# Patient Record
Sex: FEMALE | Race: WHITE | NOT HISPANIC OR LATINO | Employment: FULL TIME | URBAN - METROPOLITAN AREA
[De-identification: names, ages, dates, MRNs, and addresses within clinical notes are randomized per-mention and may not be internally consistent; named-entity substitution may affect disease eponyms.]

---

## 2023-07-05 ENCOUNTER — HOSPITAL ENCOUNTER (OUTPATIENT)
Dept: DATA CONVERSION | Facility: HOSPITAL | Age: 33
Discharge: HOME | End: 2023-07-05
Attending: EMERGENCY MEDICINE

## 2023-07-05 DIAGNOSIS — R07.9 CHEST PAIN, UNSPECIFIED: ICD-10-CM

## 2023-07-05 DIAGNOSIS — S16.1XXA STRAIN OF MUSCLE, FASCIA AND TENDON AT NECK LEVEL, INITIAL ENCOUNTER: ICD-10-CM

## 2023-07-05 DIAGNOSIS — R51.9 HEADACHE, UNSPECIFIED: ICD-10-CM

## 2023-07-05 DIAGNOSIS — M25.512 PAIN IN LEFT SHOULDER: ICD-10-CM

## 2023-07-05 DIAGNOSIS — M54.2 CERVICALGIA: ICD-10-CM

## 2023-07-05 DIAGNOSIS — M54.9 DORSALGIA, UNSPECIFIED: ICD-10-CM
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
## 2023-07-05 DIAGNOSIS — S43.402A UNSPECIFIED SPRAIN OF LEFT SHOULDER JOINT, INITIAL ENCOUNTER: Primary | ICD-10-CM

## 2023-09-06 PROBLEM — E05.00 GRAVES' DISEASE: Status: ACTIVE | Noted: 2023-09-06

## 2023-09-06 PROBLEM — F41.9: Status: ACTIVE | Noted: 2023-09-06

## 2023-09-06 PROBLEM — N23 KIDNEY PAIN: Status: ACTIVE | Noted: 2023-09-06

## 2023-09-06 PROBLEM — O99.280: Status: ACTIVE | Noted: 2023-09-06
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
## 2023-09-06 PROBLEM — E89.0 POSTPROCEDURAL HYPOTHYROIDISM: Status: ACTIVE | Noted: 2023-09-06

## 2023-09-06 PROBLEM — F41.8 DEPRESSION WITH ANXIETY: Status: ACTIVE | Noted: 2023-09-06

## 2023-09-06 PROBLEM — E07.9: Status: ACTIVE | Noted: 2023-09-06

## 2023-09-06 PROBLEM — E11.9 DIABETES MELLITUS (MULTI): Status: ACTIVE | Noted: 2023-09-06

## 2023-09-06 PROBLEM — E10.65 TYPE 1 DIABETES MELLITUS WITH HYPERGLYCEMIA (MULTI): Status: ACTIVE | Noted: 2023-09-06

## 2023-09-06 PROBLEM — O99.340: Status: ACTIVE | Noted: 2023-09-06

## 2023-09-06 PROBLEM — K42.9 UMBILICAL HERNIA: Status: ACTIVE | Noted: 2023-09-06

## 2023-09-06 PROBLEM — O09.90 HIGH-RISK PREGNANCY (HHS-HCC): Status: ACTIVE | Noted: 2023-09-06

## 2023-09-06 PROBLEM — O35.BXX0 ABNORMAL FETAL ECHOCARDIOGRAM AFFECTING ANTEPARTUM CARE OF MOTHER (HHS-HCC): Status: ACTIVE | Noted: 2023-09-06

## 2023-09-06 PROBLEM — R07.9 CHEST PAIN: Status: ACTIVE | Noted: 2023-09-06

## 2023-09-06 RX ORDER — BLOOD SUGAR DIAGNOSTIC
STRIP MISCELLANEOUS
COMMUNITY
Start: 2021-07-20 | End: 2024-01-18

## 2023-09-06 RX ORDER — INSULIN DEGLUDEC 100 U/ML
INJECTION, SOLUTION SUBCUTANEOUS
COMMUNITY
End: 2024-02-02

## 2023-09-06 RX ORDER — INSULIN LISPRO 100 [IU]/ML
INJECTION, SOLUTION INTRAVENOUS; SUBCUTANEOUS
COMMUNITY
Start: 2021-11-10

## 2023-09-06 RX ORDER — ERYTHROMYCIN AND BENZOYL PEROXIDE 30; 50 MG/G; MG/G
1 GEL TOPICAL
COMMUNITY
Start: 2016-04-08 | End: 2024-01-18

## 2023-09-06 RX ORDER — DOCUSATE SODIUM 100 MG/1
100 CAPSULE, LIQUID FILLED ORAL 2 TIMES DAILY PRN
COMMUNITY
Start: 2014-11-11 | End: 2024-01-18

## 2023-09-06 RX ORDER — BLOOD-GLUCOSE CONTROL, NORMAL
EACH MISCELLANEOUS 3 TIMES DAILY
COMMUNITY
Start: 2022-11-08

## 2023-09-06 RX ORDER — NAPROXEN SODIUM 220 MG/1
TABLET, FILM COATED ORAL
COMMUNITY
Start: 2021-09-16 | End: 2024-01-18

## 2023-09-06 RX ORDER — QUETIAPINE FUMARATE 50 MG/1
50 TABLET, FILM COATED ORAL NIGHTLY
COMMUNITY
End: 2024-01-18

## 2023-09-06 RX ORDER — CALCIUM CITRATE/VITAMIN D3 200MG-6.25
TABLET ORAL 3 TIMES DAILY
COMMUNITY
Start: 2020-02-19

## 2023-09-06 RX ORDER — KETOROLAC TROMETHAMINE 10 MG/1
10 TABLET, FILM COATED ORAL 3 TIMES DAILY PRN
COMMUNITY
Start: 2021-11-19 | End: 2024-01-18

## 2023-09-06 RX ORDER — METHOCARBAMOL 750 MG/1
TABLET, FILM COATED ORAL EVERY 8 HOURS PRN
COMMUNITY
End: 2024-01-18

## 2023-09-06 RX ORDER — IBUPROFEN 600 MG/1
600 TABLET ORAL EVERY 6 HOURS PRN
COMMUNITY
Start: 2021-11-10 | End: 2024-01-18

## 2023-09-06 RX ORDER — LEVOTHYROXINE SODIUM 125 UG/1
125 TABLET ORAL
COMMUNITY
End: 2024-01-18

## 2023-09-06 RX ORDER — POLYETHYLENE GLYCOL 3350 17 G/17G
17 POWDER, FOR SOLUTION ORAL DAILY PRN
COMMUNITY
Start: 2021-11-10 | End: 2024-01-18

## 2023-10-06 ENCOUNTER — HOSPITAL ENCOUNTER (OUTPATIENT)
Dept: RADIOLOGY | Facility: HOSPITAL | Age: 33
Discharge: HOME | End: 2023-10-06
Payer: COMMERCIAL

## 2023-10-06 DIAGNOSIS — R10.30 LOWER ABDOMINAL PAIN, UNSPECIFIED: ICD-10-CM

## 2023-10-06 PROCEDURE — 2550000001 HC RX 255 CONTRASTS: Performed by: FAMILY MEDICINE

## 2023-10-06 PROCEDURE — 74177 CT ABD & PELVIS W/CONTRAST: CPT

## 2023-10-06 RX ADMIN — IOHEXOL 75 ML: 350 INJECTION, SOLUTION INTRAVENOUS at 11:27

## 2023-11-05 DIAGNOSIS — E10.65 TYPE 1 DIABETES MELLITUS WITH HYPERGLYCEMIA (MULTI): Primary | ICD-10-CM

## 2023-11-06 RX ORDER — INSULIN ASPART 100 [IU]/ML
INJECTION, SOLUTION INTRAVENOUS; SUBCUTANEOUS
Qty: 90 ML | Refills: 2 | Status: SHIPPED | OUTPATIENT
Start: 2023-11-06

## 2023-11-15 ENCOUNTER — ANCILLARY PROCEDURE (OUTPATIENT)
Dept: RADIOLOGY | Facility: CLINIC | Age: 33
End: 2023-11-15
Payer: COMMERCIAL

## 2023-11-15 DIAGNOSIS — R10.2 PELVIC AND PERINEAL PAIN: ICD-10-CM

## 2023-11-15 PROCEDURE — 76830 TRANSVAGINAL US NON-OB: CPT | Performed by: RADIOLOGY

## 2023-11-15 PROCEDURE — 76830 TRANSVAGINAL US NON-OB: CPT

## 2023-11-15 PROCEDURE — 76856 US EXAM PELVIC COMPLETE: CPT | Performed by: RADIOLOGY

## 2023-11-15 PROCEDURE — 76856 US EXAM PELVIC COMPLETE: CPT

## 2023-11-21 ENCOUNTER — TELEPHONE (OUTPATIENT)
Dept: ENDOCRINOLOGY | Facility: CLINIC | Age: 33
End: 2023-11-21
Payer: COMMERCIAL

## 2023-11-21 NOTE — TELEPHONE ENCOUNTER
Patient seen her primary and they said her TSH was low they changed her  medication from 125 to 112mcg also she said her neck feels swollen

## 2023-11-29 DIAGNOSIS — E10.65 TYPE 1 DIABETES MELLITUS WITH HYPERGLYCEMIA (MULTI): ICD-10-CM

## 2023-11-29 RX ORDER — PEN NEEDLE, DIABETIC 29 G X1/2"
NEEDLE, DISPOSABLE MISCELLANEOUS
Qty: 400 EACH | Refills: 3 | Status: SHIPPED | OUTPATIENT
Start: 2023-11-29

## 2024-01-18 ENCOUNTER — OFFICE VISIT (OUTPATIENT)
Dept: ENDOCRINOLOGY | Facility: CLINIC | Age: 34
End: 2024-01-18
Payer: COMMERCIAL

## 2024-01-18 VITALS
DIASTOLIC BLOOD PRESSURE: 67 MMHG | HEART RATE: 64 BPM | BODY MASS INDEX: 22.28 KG/M2 | WEIGHT: 129.8 LBS | SYSTOLIC BLOOD PRESSURE: 98 MMHG

## 2024-01-18 DIAGNOSIS — E05.00 GRAVES' DISEASE: ICD-10-CM

## 2024-01-18 DIAGNOSIS — R19.7 DIARRHEA IN ADULT PATIENT: ICD-10-CM

## 2024-01-18 DIAGNOSIS — E10.65 TYPE 1 DIABETES MELLITUS WITH HYPERGLYCEMIA (MULTI): Primary | ICD-10-CM

## 2024-01-18 LAB — POC HEMOGLOBIN A1C: 7 % (ref 4.2–6.5)

## 2024-01-18 PROCEDURE — 83036 HEMOGLOBIN GLYCOSYLATED A1C: CPT | Performed by: INTERNAL MEDICINE

## 2024-01-18 PROCEDURE — 99214 OFFICE O/P EST MOD 30 MIN: CPT | Performed by: INTERNAL MEDICINE

## 2024-01-18 PROCEDURE — 3078F DIAST BP <80 MM HG: CPT | Performed by: INTERNAL MEDICINE

## 2024-01-18 PROCEDURE — 95251 CONT GLUC MNTR ANALYSIS I&R: CPT | Performed by: INTERNAL MEDICINE

## 2024-01-18 PROCEDURE — 1036F TOBACCO NON-USER: CPT | Performed by: INTERNAL MEDICINE

## 2024-01-18 PROCEDURE — 3074F SYST BP LT 130 MM HG: CPT | Performed by: INTERNAL MEDICINE

## 2024-01-18 RX ORDER — LEVOTHYROXINE SODIUM 112 UG/1
112 TABLET ORAL
COMMUNITY
Start: 2023-11-10 | End: 2024-04-29 | Stop reason: SDUPTHER

## 2024-01-18 RX ORDER — LANCETS 33 GAUGE
EACH MISCELLANEOUS
COMMUNITY
Start: 2023-08-01

## 2024-01-18 RX ORDER — TRETINOIN 0.25 MG/G
GEL TOPICAL
COMMUNITY
Start: 2023-09-18

## 2024-01-18 RX ORDER — BLOOD-GLUCOSE SENSOR
EACH MISCELLANEOUS
COMMUNITY
Start: 2024-01-02

## 2024-01-18 RX ORDER — VIT C/E/ZN/COPPR/LUTEIN/ZEAXAN 250MG-90MG
CAPSULE ORAL
COMMUNITY

## 2024-01-18 ASSESSMENT — ENCOUNTER SYMPTOMS
OCCASIONAL FEELINGS OF UNSTEADINESS: 0
LOSS OF SENSATION IN FEET: 0
DEPRESSION: 0

## 2024-01-18 ASSESSMENT — PATIENT HEALTH QUESTIONNAIRE - PHQ9
2. FEELING DOWN, DEPRESSED OR HOPELESS: NOT AT ALL
SUM OF ALL RESPONSES TO PHQ9 QUESTIONS 1 & 2: 0
1. LITTLE INTEREST OR PLEASURE IN DOING THINGS: NOT AT ALL

## 2024-01-18 ASSESSMENT — PAIN SCALES - GENERAL: PAINLEVEL: 0-NO PAIN

## 2024-01-18 NOTE — PROGRESS NOTES
"HPI   32 yo with Diabetes 1 (dx age 23) , graves dz s/p I 131 presents for followup. Last A1c-6.4%, today 7%.     Pt is testing sugars 4 times per day, aroldo 3. Pt is having low sugars 0-1 times/week. Pt is following a carb controlled diet and knows reasonable carb allowances. Pt is able to afford their medications. Pt is active at work.           Tresiba 15 units, novolog 2-4 units at meals, snacks 2 units (variable)  -not interesting in AID at this time           Taking 125mcg T4, Pt feels euthyroid, compliant with meds. No graves eye sx.  -pcp recommended 112mcg based on recent labs they did           libre3 90 day data: 63% in range, 3% low, pattern: up to 200 after bedtime snack, low 100's at breakfast, mid 100's through the day, after dinner upper 100's/low 200's, prior to bedtime snack mid 100's/low 200's after snack.    -pt has been more stressed lately, she feels like this has impacted her glycemic control    Current Outpatient Medications:     blood sugar diagnostic (True Metrix Glucose Test Strip) strip, 3 times a day. As directed, Disp: , Rfl:     calcium carbonate/vitamin D3 (CALCIUM PLUS VITAMIN D PO), Take by mouth., Disp: , Rfl:     cholecalciferol (Vitamin D-3) 25 MCG (1000 UT) capsule, Take by mouth., Disp: , Rfl:     FreeStyle Aroldo 3 Sensor device, USE AS DIRECTED EVERY 14 DAYS, Disp: , Rfl:     insulin degludec (Tresiba U-100 Insulin) 100 unit/mL injection, Inject under the skin., Disp: , Rfl:     insulin syringe-needle U-100 (BD Insulin Syringe Ultra-Fine) 31G X 5/16\" 0.5 mL syringe, USE TO INJECT INSULIN FOUR TIMES A DAY AS DIRECTED, Disp: 400 each, Rfl: 3    lancets (BD Ultra-Fine II Lancets) 30 gauge misc, Inject into the skin 3 times a day., Disp: , Rfl:     levothyroxine (Synthroid, Levoxyl) 112 mcg tablet, Take 1 tablet (112 mcg) by mouth once daily in the morning. Take before meals., Disp: , Rfl:     NovoLOG U-100 Insulin aspart 100 unit/mL injection, INJECT UP  UNITS " SUBCUTANEOUSLY DAILY AS DIRECTED, Disp: 90 mL, Rfl: 2    potassium/magnesium (MAGNESIUM-POTASSIUM ORAL), Take by mouth., Disp: , Rfl:     tretinoin (Retin-A) 0.025 % gel, APPLY TO FACE DAILY AT NIGHT, Disp: , Rfl:     TRUEplus Lancets 33 gauge misc, TEST 3 TIMES A DAY AS DIRECTED, Disp: , Rfl:     insulin lispro (HumaLOG) 100 unit/mL injection, Inject under the skin., Disp: , Rfl:       Allergies as of 01/18/2024    (No Known Allergies)         Review of Systems   Cardiology: Lightheadedness-denies.  Chest pain-occ sharp pains.  Leg edema-denies.  Palpitations-denies.  Respiratory: Cough-denies. Shortness of breath-when stressed.  Wheezing-denies.  Gastroenterology: Constipation-denies.  Diarrhea-denies.  Heartburn-denies.  Endocrinology: Cold intolerance +.  Heat intolerance-denies.  Sweats-denies.  Neurology: Headache-denies.  Tremor-denies.  Neuropathy in extremities-denies.  Psychology: Low energy-denies.  Irritability +stress.  Sleep disturbances-denies.      BP 98/67 (BP Location: Left arm)   Pulse 64   Wt 58.9 kg (129 lb 12.8 oz)   BMI 22.28 kg/m²       Labs:  Lab Results   Component Value Date    WBC 7.3 02/16/2023    NRBC 0 02/16/2023    RBC 4.87 02/16/2023    HGB 14.0 02/16/2023    HCT 42.3 02/16/2023     02/16/2023     Lab Results   Component Value Date    CALCIUM 9.4 01/09/2023    AST 16 02/16/2023    ALKPHOS 104 02/16/2023    BILITOT 0.5 02/16/2023    PROT 7.4 02/16/2023    ALBUMIN 4.4 02/16/2023    GLOB 3.0 02/16/2023    AGR 1.5 02/16/2023     01/09/2023    K 4.1 01/09/2023     01/09/2023    CO2 28 01/09/2023    ANIONGAP 10 01/09/2023    BUN 13 01/09/2023    CREATININE 0.7 01/09/2023    UREACREAUR 18.6 01/09/2023    GLUCOSE 74 01/09/2023    ALT 13 02/16/2023    EGFR 119 01/09/2023     Lab Results   Component Value Date    CHOL 174 02/16/2023    TRIG 75 02/16/2023    HDL 38 (L) 02/16/2023    LDLCALC 121 02/16/2023     Lab Results   Component Value Date    MICROALBCREA 8.2  "01/09/2023     Lab Results   Component Value Date    TSH 1.48 01/09/2023     No results found for: \"FOLVCLOM80\"  Lab Results   Component Value Date    HGBA1C 8.3 (H) 10/29/2021         Physical Exam   General Appearance: pleasant, cooperative, no acute distress  HEENT: no chemosis, no proptosis, no lid lag, no lid retraction  Neck: no lymphadenopathy, no thyromegaly, no dominant thyroid nodules  Heart: no murmurs, regular rate and rhythm, S1 and S2  Lungs: no wheezes, no rhonci, no rales  Extremities: no lower extremity swelling      Assessment/Plan   1. Type 1 diabetes mellitus with hyperglycemia (CMS/HCC)  -A1c ordered and reviewd  -gennaro 3 data reviewed (scanned in epic)    -lower tresiba from 15 to 13 for am sugars low 100's (pt has been eating at bedtime snack to avoid am lows)  -consider 1 extra unit of novolog with dinner.    -reviewed glycemic targets    2. Graves' disease  -lowering to 112mcg, repeat labs in 6 weeks    3.diarrhea/gi upset  -improved after eliminating lactose  -did see gi  -screen for celiac disease given her other autoimmune issues         Follow Up:  JOHN Mar 6 months    Medical Decision Making  Complexity of problem: Chronic illness of diabetes mellitus uncontrolled, progressing  Data analyzed and reviewed: Reviewed prior notes, blood glucose data, labs including HgbA1c, lipids, serum chemistries.  Ordered tests.   Risk of complications and morbidities: Is definite because of use of insulin and risk of hypoglycemia.  Prescription medications reviewed and modifications made.  Compliance assessed.  Addressed social determinants of health including food insecurity.       "

## 2024-01-28 ENCOUNTER — HOSPITAL ENCOUNTER (OUTPATIENT)
Dept: RADIOLOGY | Facility: HOSPITAL | Age: 34
Discharge: HOME | End: 2024-01-28
Payer: COMMERCIAL

## 2024-01-28 DIAGNOSIS — R22.2 LOCALIZED SWELLING, MASS AND LUMP, TRUNK: ICD-10-CM

## 2024-01-28 PROCEDURE — 76604 US EXAM CHEST: CPT

## 2024-02-02 DIAGNOSIS — E10.65 TYPE 1 DIABETES MELLITUS WITH HYPERGLYCEMIA (MULTI): Primary | ICD-10-CM

## 2024-02-02 RX ORDER — INSULIN DEGLUDEC INJECTION 100 U/ML
INJECTION, SOLUTION SUBCUTANEOUS
Qty: 20 ML | Refills: 2 | Status: SHIPPED | OUTPATIENT
Start: 2024-02-02 | End: 2024-04-22

## 2024-02-26 ENCOUNTER — LAB (OUTPATIENT)
Dept: LAB | Facility: LAB | Age: 34
End: 2024-02-26
Payer: COMMERCIAL

## 2024-02-26 ENCOUNTER — PATIENT MESSAGE (OUTPATIENT)
Dept: ENDOCRINOLOGY | Facility: CLINIC | Age: 34
End: 2024-02-26

## 2024-02-26 DIAGNOSIS — E05.00 GRAVES' DISEASE: ICD-10-CM

## 2024-02-26 DIAGNOSIS — E10.65 TYPE 1 DIABETES MELLITUS WITH HYPERGLYCEMIA (MULTI): ICD-10-CM

## 2024-02-26 DIAGNOSIS — R19.7 DIARRHEA IN ADULT PATIENT: ICD-10-CM

## 2024-02-26 LAB
ALBUMIN SERPL BCP-MCNC: 4.5 G/DL (ref 3.4–5)
ALP SERPL-CCNC: 62 U/L (ref 33–110)
ALT SERPL W P-5'-P-CCNC: 14 U/L (ref 7–45)
ANION GAP SERPL CALC-SCNC: 10 MMOL/L (ref 10–20)
AST SERPL W P-5'-P-CCNC: 13 U/L (ref 9–39)
BILIRUB SERPL-MCNC: 0.8 MG/DL (ref 0–1.2)
BUN SERPL-MCNC: 16 MG/DL (ref 6–23)
CALCIUM SERPL-MCNC: 9.9 MG/DL (ref 8.6–10.6)
CHLORIDE SERPL-SCNC: 105 MMOL/L (ref 98–107)
CHOLEST SERPL-MCNC: 141 MG/DL (ref 0–199)
CHOLESTEROL/HDL RATIO: 3.4
CO2 SERPL-SCNC: 31 MMOL/L (ref 21–32)
CREAT SERPL-MCNC: 0.68 MG/DL (ref 0.5–1.05)
CREAT UR-MCNC: 164 MG/DL (ref 20–320)
EGFRCR SERPLBLD CKD-EPI 2021: >90 ML/MIN/1.73M*2
GLUCOSE SERPL-MCNC: 77 MG/DL (ref 74–99)
HDLC SERPL-MCNC: 41 MG/DL
LDLC SERPL CALC-MCNC: 90 MG/DL
MICROALBUMIN UR-MCNC: 7.3 MG/L
MICROALBUMIN/CREAT UR: 4.5 UG/MG CREAT
NON HDL CHOLESTEROL: 100 MG/DL (ref 0–149)
POTASSIUM SERPL-SCNC: 3.9 MMOL/L (ref 3.5–5.3)
PROT SERPL-MCNC: 6.8 G/DL (ref 6.4–8.2)
SODIUM SERPL-SCNC: 142 MMOL/L (ref 136–145)
T4 FREE SERPL-MCNC: 1.07 NG/DL (ref 0.78–1.48)
TRIGL SERPL-MCNC: 48 MG/DL (ref 0–149)
TSH SERPL-ACNC: 5.19 MIU/L (ref 0.44–3.98)
TTG IGA SER IA-ACNC: <1 U/ML
VLDL: 10 MG/DL (ref 0–40)

## 2024-02-26 PROCEDURE — 82570 ASSAY OF URINE CREATININE: CPT

## 2024-02-26 PROCEDURE — 84443 ASSAY THYROID STIM HORMONE: CPT

## 2024-02-26 PROCEDURE — 82043 UR ALBUMIN QUANTITATIVE: CPT

## 2024-02-26 PROCEDURE — 83516 IMMUNOASSAY NONANTIBODY: CPT

## 2024-02-26 PROCEDURE — 80053 COMPREHEN METABOLIC PANEL: CPT

## 2024-02-26 PROCEDURE — 84439 ASSAY OF FREE THYROXINE: CPT

## 2024-02-26 PROCEDURE — 80061 LIPID PANEL: CPT

## 2024-02-26 PROCEDURE — 36415 COLL VENOUS BLD VENIPUNCTURE: CPT

## 2024-02-28 LAB — TTG IGG SER IA-ACNC: <0.82 FLU (ref 0–4.99)

## 2024-03-07 PROCEDURE — 88175 CYTOPATH C/V AUTO FLUID REDO: CPT

## 2024-03-08 ENCOUNTER — LAB REQUISITION (OUTPATIENT)
Dept: LAB | Facility: HOSPITAL | Age: 34
End: 2024-03-08
Payer: COMMERCIAL

## 2024-03-08 DIAGNOSIS — Z01.419 ENCOUNTER FOR GYNECOLOGICAL EXAMINATION (GENERAL) (ROUTINE) WITHOUT ABNORMAL FINDINGS: ICD-10-CM

## 2024-03-08 DIAGNOSIS — Z11.51 ENCOUNTER FOR SCREENING FOR HUMAN PAPILLOMAVIRUS (HPV): ICD-10-CM

## 2024-03-15 ENCOUNTER — HOSPITAL ENCOUNTER (OUTPATIENT)
Dept: RADIOLOGY | Facility: HOSPITAL | Age: 34
Discharge: HOME | End: 2024-03-15
Payer: COMMERCIAL

## 2024-03-15 VITALS — WEIGHT: 130 LBS | BODY MASS INDEX: 22.2 KG/M2 | HEIGHT: 64 IN

## 2024-03-15 DIAGNOSIS — N60.02 SOLITARY CYST OF LEFT BREAST: ICD-10-CM

## 2024-03-15 DIAGNOSIS — N60.01 SOLITARY CYST OF RIGHT BREAST: ICD-10-CM

## 2024-03-15 DIAGNOSIS — R92.8 OTHER ABNORMAL AND INCONCLUSIVE FINDINGS ON DIAGNOSTIC IMAGING OF BREAST: ICD-10-CM

## 2024-03-15 PROCEDURE — 76642 ULTRASOUND BREAST LIMITED: CPT | Mod: 50

## 2024-03-15 PROCEDURE — 77062 BREAST TOMOSYNTHESIS BI: CPT

## 2024-03-20 LAB
CYTOLOGY CMNT CVX/VAG CYTO-IMP: NORMAL
LAB AP HPV GENOTYPE QUESTION: YES
LAB AP HPV HR: NORMAL
LABORATORY COMMENT REPORT: NORMAL
LMP START DATE: NORMAL
PATH REPORT.TOTAL CANCER: NORMAL

## 2024-04-22 DIAGNOSIS — E10.65 TYPE 1 DIABETES MELLITUS WITH HYPERGLYCEMIA (MULTI): ICD-10-CM

## 2024-04-22 RX ORDER — INSULIN DEGLUDEC INJECTION 100 U/ML
INJECTION, SOLUTION SUBCUTANEOUS
Qty: 20 ML | Refills: 2 | Status: SHIPPED | OUTPATIENT
Start: 2024-04-22

## 2024-04-29 DIAGNOSIS — E05.00 GRAVES' DISEASE: Primary | ICD-10-CM

## 2024-04-29 RX ORDER — LEVOTHYROXINE SODIUM 112 UG/1
112 TABLET ORAL
Qty: 90 TABLET | Refills: 0 | Status: SHIPPED | OUTPATIENT
Start: 2024-04-29 | End: 2024-07-28

## 2024-07-15 ENCOUNTER — APPOINTMENT (OUTPATIENT)
Dept: ENDOCRINOLOGY | Facility: CLINIC | Age: 34
End: 2024-07-15
Payer: COMMERCIAL

## 2024-08-25 DIAGNOSIS — E10.65 TYPE 1 DIABETES MELLITUS WITH HYPERGLYCEMIA (MULTI): Primary | ICD-10-CM

## 2024-08-26 RX ORDER — BLOOD-GLUCOSE SENSOR
EACH MISCELLANEOUS
Qty: 2 EACH | Refills: 11 | Status: SHIPPED | OUTPATIENT
Start: 2024-08-26

## 2024-08-27 DIAGNOSIS — E10.65 TYPE 1 DIABETES MELLITUS WITH HYPERGLYCEMIA (MULTI): ICD-10-CM

## 2024-08-27 RX ORDER — INSULIN DEGLUDEC 100 U/ML
INJECTION, SOLUTION SUBCUTANEOUS
Qty: 15 ML | Refills: 3 | Status: SHIPPED | OUTPATIENT
Start: 2024-08-27 | End: 2024-08-30 | Stop reason: WASHOUT

## 2024-08-27 NOTE — TELEPHONE ENCOUNTER
We are sending your prescription to [facility] for benefits investigation and affordability support.      If you have any questions or would like to check the status of your prescription(s),  please contact the pharmacy directly at [phone].

## 2024-08-29 NOTE — PROGRESS NOTES
"HPI   34 yo with Diabetes 1 (dx age 23) , graves dz s/p I 131 presents for followup. Last A1c-7%, today 6.8%.      Pt is testing sugars 4 times per day, aroldo 3. Pt is having low sugars 0-1 times/week. Pt is following a carb controlled diet and knows reasonable carb allowances. Pt is able to afford their medications. Pt is active at work.           Tresiba 15 units, novolog 2-4 units at meals, snacks 2 units (variable)  -often dosing after meals  -not interesting in AID at this time           Was taking 125mcg T4, Pt feels euthyroid, compliant with meds. No graves eye sx.  -pcp recommended 112mcg and has been on this, will need to repeat labs again           libre3 90 day data: 50% in range, 5% low, pattern: 200's overnight, waking low 100's, mid 100's through the day, after dinner 200's until bedtime.      -pt had 2 miscarriages since last visit    Current Outpatient Medications:     blood sugar diagnostic (True Metrix Glucose Test Strip) strip, 3 times a day. As directed, Disp: , Rfl:     blood-glucose sensor (FreeStyle Aroldo 3 Sensor) device, USE AS DIRECTED EVERY 14 DAYS, Disp: 2 each, Rfl: 11    calcium carbonate/vitamin D3 (CALCIUM PLUS VITAMIN D PO), Take by mouth., Disp: , Rfl:     cholecalciferol (Vitamin D-3) 25 MCG (1000 UT) capsule, Take by mouth., Disp: , Rfl:     doxycycline (Vibramycin) 50 mg capsule, Take 1 capsule (50 mg) by mouth early in the morning.., Disp: , Rfl:     insulin degludec (Tresiba FlexTouch U-100) 100 unit/mL (3 mL) injection, Inject up to 20  units per day, Disp: 15 mL, Rfl: 3    insulin lispro (HumaLOG) 100 unit/mL injection, Inject under the skin., Disp: , Rfl:     insulin syringe-needle U-100 (BD Insulin Syringe Ultra-Fine) 31G X 5/16\" 0.5 mL syringe, USE TO INJECT INSULIN FOUR TIMES A DAY AS DIRECTED, Disp: 400 each, Rfl: 3    lancets (BD Ultra-Fine II Lancets) 30 gauge misc, Inject into the skin 3 times a day., Disp: , Rfl:     NovoLOG U-100 Insulin aspart 100 unit/mL " "injection, INJECT UP  UNITS SUBCUTANEOUSLY DAILY AS DIRECTED, Disp: 90 mL, Rfl: 2    potassium/magnesium (MAGNESIUM-POTASSIUM ORAL), Take by mouth., Disp: , Rfl:     TRUEplus Lancets 33 gauge misc, TEST 3 TIMES A DAY AS DIRECTED, Disp: , Rfl:     levothyroxine (Synthroid, Levoxyl) 112 mcg tablet, Take 1 tablet (112 mcg) by mouth once daily in the morning. Take before meals., Disp: 90 tablet, Rfl: 0    tretinoin (Retin-A) 0.025 % gel, APPLY TO FACE DAILY AT NIGHT, Disp: , Rfl:       Allergies as of 08/30/2024    (No Known Allergies)         Review of Systems   Cardiology: Lightheadedness-denies.  Chest pain-denies.  Leg edema-denies.  Palpitations-denies.  Respiratory: Cough-denies. Shortness of breath-denies.  Wheezing-denies.  Gastroenterology: Constipation-denies.  Diarrhea-denies.  Heartburn-denies.  Endocrinology: Cold intolerance-denies.  Heat intolerance-denies.  Sweats-denies.  Neurology: Headache-denies.  Tremor-denies.  Neuropathy in extremities-denies.  Psychology: Low energy-denies.  Irritability +.  Sleep disturbances-denies.      BP 93/64   Pulse 87   Ht 1.626 m (5' 4\")   Wt 59.3 kg (130 lb 12.8 oz)   BMI 22.45 kg/m²       Labs:  Lab Results   Component Value Date    WBC 7.3 02/16/2023    NRBC 0 02/16/2023    RBC 4.87 02/16/2023    HGB 14.0 02/16/2023    HCT 42.3 02/16/2023     02/16/2023     Lab Results   Component Value Date    CALCIUM 9.9 02/26/2024    AST 13 02/26/2024    ALKPHOS 62 02/26/2024    BILITOT 0.8 02/26/2024    PROT 6.8 02/26/2024    ALBUMIN 4.5 02/26/2024    GLOB 3.0 02/16/2023    AGR 1.5 02/16/2023     02/26/2024    K 3.9 02/26/2024     02/26/2024    CO2 31 02/26/2024    ANIONGAP 10 02/26/2024    BUN 16 02/26/2024    CREATININE 0.68 02/26/2024    UREACREAUR 18.6 01/09/2023    GLUCOSE 77 02/26/2024    ALT 14 02/26/2024    EGFR >90 02/26/2024     Lab Results   Component Value Date    CHOL 141 02/26/2024    TRIG 48 02/26/2024    HDL 41.0 02/26/2024    LDLCALC " "90 02/26/2024     Lab Results   Component Value Date    MICROALBCREA 4.5 02/26/2024     Lab Results   Component Value Date    TSH 5.19 (H) 02/26/2024     No results found for: \"EKBIMDMY32\"  Lab Results   Component Value Date    HGBA1C 6.8 (A) 08/30/2024         Physical Exam   General Appearance: pleasant, cooperative, no acute distress  HEENT: no chemosis, no proptosis, no lid lag, no lid retraction  Neck: no lymphadenopathy, no thyromegaly, no dominant thyroid nodules  Heart: no murmurs, regular rate and rhythm, S1 and S2  Lungs: no wheezes, no rhonci, no rales  Extremities: no lower extremity swelling      Assessment/Plan   1. Type 1 diabetes mellitus with hyperglycemia (Multi)  -A1c ordered and reviewed  -labs reviewed and ordered  -gennaro data reviewed, scanned in epic    -not dosing before meals, not dosing enough at dinner  -dose before meals, consider extra 1-2 units with dinner  -had long discussion regarding aid, pt given handouts and will consider the tandem    -informed pt pregnancy should be planned and blood sugar control optimized prior to pregnancy    -please have dilated eye exam    2. Graves' disease  -on 112 mcg levothyroxine, had taken 125mcg in the past  -repeat labs on 112mcg          Follow Up:  susana KilpatrickD 3 months    Medical Decision Making  Complexity of problem: Chronic illness of diabetes mellitus uncontrolled, progressing  Data analyzed and reviewed: Reviewed prior notes, blood glucose data, labs including HgbA1c, lipids, serum chemistries.  Ordered tests.   Risk of complications and morbidities: Is definite because of use of insulin and risk of hypoglycemia.  Prescription medications reviewed and modifications made.  Compliance assessed.  Addressed social determinants of health including food insecurity.         "

## 2024-08-30 ENCOUNTER — APPOINTMENT (OUTPATIENT)
Dept: ENDOCRINOLOGY | Facility: CLINIC | Age: 34
End: 2024-08-30
Payer: COMMERCIAL

## 2024-08-30 ENCOUNTER — LAB (OUTPATIENT)
Dept: LAB | Facility: LAB | Age: 34
End: 2024-08-30
Payer: COMMERCIAL

## 2024-08-30 VITALS
HEIGHT: 64 IN | SYSTOLIC BLOOD PRESSURE: 93 MMHG | HEART RATE: 87 BPM | BODY MASS INDEX: 22.33 KG/M2 | DIASTOLIC BLOOD PRESSURE: 64 MMHG | WEIGHT: 130.8 LBS

## 2024-08-30 DIAGNOSIS — E05.00 GRAVES' DISEASE: ICD-10-CM

## 2024-08-30 DIAGNOSIS — E10.65 TYPE 1 DIABETES MELLITUS WITH HYPERGLYCEMIA (MULTI): Primary | ICD-10-CM

## 2024-08-30 LAB
POC HEMOGLOBIN A1C: 6.8 % (ref 4.2–6.5)
TSH SERPL-ACNC: 0.52 MIU/L (ref 0.44–3.98)

## 2024-08-30 PROCEDURE — 3078F DIAST BP <80 MM HG: CPT | Performed by: INTERNAL MEDICINE

## 2024-08-30 PROCEDURE — 3061F NEG MICROALBUMINURIA REV: CPT | Performed by: INTERNAL MEDICINE

## 2024-08-30 PROCEDURE — 36415 COLL VENOUS BLD VENIPUNCTURE: CPT

## 2024-08-30 PROCEDURE — 3048F LDL-C <100 MG/DL: CPT | Performed by: INTERNAL MEDICINE

## 2024-08-30 PROCEDURE — 95251 CONT GLUC MNTR ANALYSIS I&R: CPT | Performed by: INTERNAL MEDICINE

## 2024-08-30 PROCEDURE — 3008F BODY MASS INDEX DOCD: CPT | Performed by: INTERNAL MEDICINE

## 2024-08-30 PROCEDURE — 83036 HEMOGLOBIN GLYCOSYLATED A1C: CPT | Performed by: INTERNAL MEDICINE

## 2024-08-30 PROCEDURE — 3074F SYST BP LT 130 MM HG: CPT | Performed by: INTERNAL MEDICINE

## 2024-08-30 PROCEDURE — 1036F TOBACCO NON-USER: CPT | Performed by: INTERNAL MEDICINE

## 2024-08-30 PROCEDURE — 84443 ASSAY THYROID STIM HORMONE: CPT

## 2024-08-30 PROCEDURE — 99214 OFFICE O/P EST MOD 30 MIN: CPT | Performed by: INTERNAL MEDICINE

## 2024-08-30 RX ORDER — LEVOTHYROXINE SODIUM 125 UG/1
125 TABLET ORAL
COMMUNITY
Start: 2024-02-02 | End: 2024-05-02 | Stop reason: WASHOUT

## 2024-08-30 RX ORDER — INSULIN DEGLUDEC 100 U/ML
INJECTION, SOLUTION SUBCUTANEOUS
Qty: 10 ML | Refills: 6 | Status: SHIPPED | OUTPATIENT
Start: 2024-08-30

## 2024-08-30 RX ORDER — NAPROXEN SODIUM 220 MG
TABLET ORAL
Qty: 100 EACH | Refills: 6 | Status: SHIPPED | OUTPATIENT
Start: 2024-08-30

## 2024-08-30 RX ORDER — INSULIN LISPRO 100 [IU]/ML
INJECTION, SOLUTION INTRAVENOUS; SUBCUTANEOUS
Qty: 10 ML | Refills: 6 | Status: SHIPPED | OUTPATIENT
Start: 2024-08-30

## 2024-08-30 RX ORDER — DOXYCYCLINE HYCLATE 50 MG/1
1 CAPSULE ORAL
COMMUNITY
Start: 2024-04-05

## 2024-08-30 ASSESSMENT — LIFESTYLE VARIABLES
HOW OFTEN DO YOU HAVE A DRINK CONTAINING ALCOHOL: NEVER
SKIP TO QUESTIONS 9-10: 1
HOW MANY STANDARD DRINKS CONTAINING ALCOHOL DO YOU HAVE ON A TYPICAL DAY: PATIENT DOES NOT DRINK
AUDIT-C TOTAL SCORE: 0
HOW OFTEN DO YOU HAVE SIX OR MORE DRINKS ON ONE OCCASION: NEVER

## 2024-08-30 ASSESSMENT — PATIENT HEALTH QUESTIONNAIRE - PHQ9
SUM OF ALL RESPONSES TO PHQ9 QUESTIONS 1 & 2: 0
2. FEELING DOWN, DEPRESSED OR HOPELESS: NOT AT ALL
1. LITTLE INTEREST OR PLEASURE IN DOING THINGS: NOT AT ALL

## 2024-08-30 ASSESSMENT — PAIN SCALES - GENERAL: PAINLEVEL: 0-NO PAIN

## 2024-09-17 DIAGNOSIS — E10.65 TYPE 1 DIABETES MELLITUS WITH HYPERGLYCEMIA (MULTI): Primary | ICD-10-CM

## 2024-09-17 RX ORDER — BLOOD-GLUCOSE SENSOR
EACH MISCELLANEOUS
Qty: 2 EACH | Refills: 6 | Status: SHIPPED | OUTPATIENT
Start: 2024-09-17

## 2024-09-19 DIAGNOSIS — E10.65 TYPE 1 DIABETES MELLITUS WITH HYPERGLYCEMIA (MULTI): Primary | ICD-10-CM

## 2024-09-19 RX ORDER — FLASH GLUCOSE SENSOR
KIT MISCELLANEOUS
Qty: 2 EACH | Refills: 2 | Status: SHIPPED | OUTPATIENT
Start: 2024-09-19

## 2024-10-03 DIAGNOSIS — E10.65 TYPE 1 DIABETES MELLITUS WITH HYPERGLYCEMIA (MULTI): ICD-10-CM

## 2024-10-03 RX ORDER — INSULIN LISPRO 100 [IU]/ML
INJECTION, SOLUTION INTRAVENOUS; SUBCUTANEOUS
Qty: 30 ML | Refills: 11 | Status: SHIPPED | OUTPATIENT
Start: 2024-10-03

## 2024-10-21 DIAGNOSIS — E10.65 TYPE 1 DIABETES MELLITUS WITH HYPERGLYCEMIA (MULTI): ICD-10-CM

## 2024-10-21 RX ORDER — LEVOTHYROXINE SODIUM 125 UG/1
125 TABLET ORAL
Qty: 90 TABLET | Refills: 3 | Status: SHIPPED | OUTPATIENT
Start: 2024-10-21 | End: 2025-01-19

## 2024-11-26 ENCOUNTER — APPOINTMENT (OUTPATIENT)
Dept: ENDOCRINOLOGY | Facility: CLINIC | Age: 34
End: 2024-11-26
Payer: COMMERCIAL

## 2025-02-04 ENCOUNTER — APPOINTMENT (OUTPATIENT)
Dept: ENDOCRINOLOGY | Facility: CLINIC | Age: 35
End: 2025-02-04
Payer: COMMERCIAL

## 2025-02-04 VITALS
SYSTOLIC BLOOD PRESSURE: 97 MMHG | HEART RATE: 75 BPM | HEIGHT: 64 IN | BODY MASS INDEX: 23.05 KG/M2 | WEIGHT: 135 LBS | DIASTOLIC BLOOD PRESSURE: 66 MMHG

## 2025-02-04 DIAGNOSIS — E05.00 GRAVES' DISEASE: ICD-10-CM

## 2025-02-04 DIAGNOSIS — E10.65 TYPE 1 DIABETES MELLITUS WITH HYPERGLYCEMIA (MULTI): Primary | ICD-10-CM

## 2025-02-04 LAB — POC HEMOGLOBIN A1C: 6.9 % (ref 4.2–6.5)

## 2025-02-04 PROCEDURE — 95251 CONT GLUC MNTR ANALYSIS I&R: CPT | Performed by: INTERNAL MEDICINE

## 2025-02-04 PROCEDURE — 99214 OFFICE O/P EST MOD 30 MIN: CPT | Performed by: INTERNAL MEDICINE

## 2025-02-04 PROCEDURE — 83036 HEMOGLOBIN GLYCOSYLATED A1C: CPT | Performed by: INTERNAL MEDICINE

## 2025-02-04 RX ORDER — LEVOTHYROXINE SODIUM 112 UG/1
112 TABLET ORAL
Qty: 90 TABLET | Refills: 1 | Status: SHIPPED | OUTPATIENT
Start: 2025-02-04 | End: 2025-08-03

## 2025-02-04 NOTE — PROGRESS NOTES
Attestation signed by Charan Fonseca MD on 2/4/25 at 2:18 PM.    I, Dr Charan Fonseca, have reviewed this progress note, medication list, vital signs, any pertinent lab values, and any CGM data if present with the [facility] and  face to face during this visit today. This note reflects the treatment plan that was made under my direction after reviewing the above mentioned elements while face to face with the patient and CDE.  I personally answered and addressed any questions and concerns the patient had during the visit today.  The CDE entered the data in this note under my direction and I personally reviewed it, signed any lab or medication orders that I instructed to be completed. I am the billing provider for this visit and the level of service was determined by my involvement in the Medical Decision Making Component of this visit while face to face with the patient.

## 2025-02-04 NOTE — PATIENT INSTRUCTIONS
Your A1c was 6.9% today - great work!!     No changes to your pump settings today.     Work on entering carbs for every meal - try entering 20 grams for small meals, 30 grams for medium meals, and 40 grams for large meals.     Get your labs done before your next visit at any  Lab.     Follow up with Dr. Fonseca in 6 months.

## 2025-02-04 NOTE — PROGRESS NOTES
Subjective   Patient ID: Yanira Wild is a 34 y.o. female who presents for Diabetes.  HPI  33 yo with Diabetes 1 (dx age 23) , graves dz s/p I 131 presents for followup.   Last A1c 6.8%, today 6.9%.      Pt is testing sugars 4+ times per day, aroldo 2 plus. Pt is having low sugars 0-1 times/week. Pt is following a carb controlled diet and knows reasonable carb allowances. Pt is able to afford their medications. Pt is active at work.      tSlim with Aroldo 2 plus - started Oct 2024.      Basal- 0.5,   I:C- 1:20  ISF- 50     -Previously taking Tresiba 15 units, novolog 2-4 units at meals, snacks 2 units (variable)           Taking levothyroxine 112mcg. Euthyroid, compliant with meds. No graves eye sx.  -Missed her last eye exam - needs to reschedule.           Aroldo 2 Plus 30 day data: 65% in range, 1% low, pattern: low-mid 100's overnight, waking low 100's, mid 100's through the day, after dinner 200's until bedtime. Spikes with meals typically.     -pt had 2 miscarriages prior to last visit; not currently trying to get pregnant again; aware to notify office if this changes.     Current Outpatient Medications:     blood sugar diagnostic (True Metrix Glucose Test Strip) strip, 3 times a day. As directed, Disp: , Rfl:     blood-glucose sensor (FreeStyle Aroldo 3 Plus Sensor) device, Use q 14 days, Disp: 2 each, Rfl: 6    blood-glucose sensor (FreeStyle Aroldo 3 Sensor) device, USE AS DIRECTED EVERY 14 DAYS, Disp: 2 each, Rfl: 11    calcium carbonate/vitamin D3 (CALCIUM PLUS VITAMIN D PO), Take by mouth., Disp: , Rfl:     cholecalciferol (Vitamin D-3) 25 MCG (1000 UT) capsule, Take by mouth., Disp: , Rfl:     doxycycline (Vibramycin) 50 mg capsule, Take 1 capsule (50 mg) by mouth early in the morning.., Disp: , Rfl:     flash glucose sensor kit (FreeStyle Aroldo 2 Sensor) kit, Use as instructed every 14 days, Disp: 2 each, Rfl: 2    insulin degludec (Tresiba) 100 unit/mL injection, Up to 30 units daily as directed, Disp:  "10 mL, Rfl: 6    insulin lispro (HumaLOG) 100 unit/mL injection, Up to 100 units daily in insulin pump, Disp: 30 mL, Rfl: 11    insulin syringe-needle U-100 (BD Insulin Syringe Ultra-Fine) 31G X 5/16\" 0.5 mL syringe, Use as instructed 4 times per day, Disp: 100 each, Rfl: 6    lancets (BD Ultra-Fine II Lancets) 30 gauge misc, Inject into the skin 3 times a day., Disp: , Rfl:     NovoLOG U-100 Insulin aspart 100 unit/mL injection, INJECT UP  UNITS SUBCUTANEOUSLY DAILY AS DIRECTED, Disp: 90 mL, Rfl: 2    potassium/magnesium (MAGNESIUM-POTASSIUM ORAL), Take by mouth., Disp: , Rfl:     tretinoin (Retin-A) 0.025 % gel, APPLY TO FACE DAILY AT NIGHT, Disp: , Rfl:     TRUEplus Lancets 33 gauge misc, TEST 3 TIMES A DAY AS DIRECTED, Disp: , Rfl:     levothyroxine (Synthroid, Levoxyl) 112 mcg tablet, Take 1 tablet (112 mcg) by mouth once daily in the morning. Take before meals., Disp: 90 tablet, Rfl: 1   No Known Allergies    Review of Systems  See HPI.     Objective   BP 97/66   Pulse 75   Ht 1.626 m (5' 4\")   Wt 61.2 kg (135 lb)   BMI 23.17 kg/m²     Labs:   Lab Results   Component Value Date    HGBA1C 6.9 (A) 02/04/2025     Lab Results   Component Value Date    CALCIUM 9.9 02/26/2024    AST 13 02/26/2024    ALKPHOS 62 02/26/2024    BILITOT 0.8 02/26/2024    PROT 6.8 02/26/2024    ALBUMIN 4.5 02/26/2024    GLOB 3.0 02/16/2023    AGR 1.5 02/16/2023     02/26/2024    K 3.9 02/26/2024     02/26/2024    CO2 31 02/26/2024    ANIONGAP 10 02/26/2024    BUN 16 02/26/2024    CREATININE 0.68 02/26/2024    UREACREAUR 18.6 01/09/2023    GLUCOSE 77 02/26/2024    ALT 14 02/26/2024    EGFR >90 02/26/2024     Lab Results   Component Value Date    WBC 7.3 02/16/2023    NRBC 0 02/16/2023    RBC 4.87 02/16/2023    HGB 14.0 02/16/2023    HCT 42.3 02/16/2023     02/16/2023     Lab Results   Component Value Date    CHOL 141 02/26/2024    TRIG 48 02/26/2024    HDL 41.0 02/26/2024    LDLCALC 90 02/26/2024    LDLDIRECT " "116 02/16/2023     Lab Results   Component Value Date    CREATU 164.0 02/26/2024    MICROALBCREA 4.5 02/26/2024     Lab Results   Component Value Date    TSH 0.52 08/30/2024     No results found for: \"KGSYURGU91\"  No results found for: \"VITD25\"  No results found for: \"PTH\"  No results found for: \"MG\"    Assessment    1. Type 1 diabetes mellitus with hyperglycemia (Multi)    2. Graves' disease        Medical Decision Making  Complexity of problem: Chronic illness of diabetes mellitus uncontrolled, progressing  Data analyzed and reviewed: Reviewed prior notes, blood glucose data, labs including HgbA1c, lipids, serum chemistries.  Ordered tests.   Risk of complications and morbidities: Is definite because of use of insulin and risk of hypoglycemia.  Prescription medications reviewed and modifications made.  Compliance assessed.  Addressed social determinants of health including food insecurity.    Plan   1. Type 1 diabetes mellitus with hyperglycemia (Multi) (Primary)  - POCT glycosylated hemoglobin (Hb A1C) manually resulted  - Albumin-Creatinine Ratio, Urine Random; Future  - CBC and Auto Differential; Future  - Comprehensive Metabolic Panel; Future  - Lipid Panel; Future  - TSH with reflex to Free T4 if abnormal; Future  - CBC and Auto Differential  - Comprehensive Metabolic Panel  - Lipid Panel  - TSH with reflex to Free T4 if abnormal    -A1c ordered and reviewed.   -CGM / pump data downloaded and reviewed.   -Labs reviewed.    -Overall great control since starting pump - pt is liking it and has found it easy to use.   -Not currently entering carbs for all meals - discussed entering set amounts for small / medium / large meals (20g/30g/40g); pt will work on this.   -No changes to pump settings today.   -Will recheck labs before next visit.      2. Graves' disease  - TSH with reflex to Free T4 if abnormal; Future  - TSH with reflex to Free T4 if abnormal  - levothyroxine (Synthroid, Levoxyl) 112 mcg tablet; Take 1 " tablet (112 mcg) by mouth once daily in the morning. Take before meals.  Dispense: 90 tablet; Refill: 1    -Euthyroid.   -Continue current therapy and recheck labs before next visit.      -labs/tests/notes reviewed  -reviewed and counseled patient on medication monitoring and side effects    Follow Up: 6 months, 30 minutes Dr. Fonseca     Treatment and plan discussed with Dr. Charan Fonseca.   FRANDY Oates, PharmD, BCACP, CDCES.

## 2025-02-14 LAB
ALBUMIN SERPL-MCNC: 4.4 G/DL (ref 3.6–5.1)
ALP SERPL-CCNC: 55 U/L (ref 31–125)
ALT SERPL-CCNC: 19 U/L (ref 6–29)
ANION GAP SERPL CALCULATED.4IONS-SCNC: 7 MMOL/L (CALC) (ref 7–17)
AST SERPL-CCNC: 17 U/L (ref 10–30)
BASOPHILS # BLD AUTO: 48 CELLS/UL (ref 0–200)
BASOPHILS NFR BLD AUTO: 0.7 %
BILIRUB SERPL-MCNC: 0.5 MG/DL (ref 0.2–1.2)
BUN SERPL-MCNC: 20 MG/DL (ref 7–25)
CALCIUM SERPL-MCNC: 8.9 MG/DL (ref 8.6–10.2)
CHLORIDE SERPL-SCNC: 101 MMOL/L (ref 98–110)
CHOLEST SERPL-MCNC: 132 MG/DL
CHOLEST/HDLC SERPL: 3.3 (CALC)
CO2 SERPL-SCNC: 29 MMOL/L (ref 20–32)
CREAT SERPL-MCNC: 0.84 MG/DL (ref 0.5–0.97)
EGFRCR SERPLBLD CKD-EPI 2021: 94 ML/MIN/1.73M2
EOSINOPHIL # BLD AUTO: 231 CELLS/UL (ref 15–500)
EOSINOPHIL NFR BLD AUTO: 3.4 %
ERYTHROCYTE [DISTWIDTH] IN BLOOD BY AUTOMATED COUNT: 12.1 % (ref 11–15)
GLUCOSE SERPL-MCNC: 248 MG/DL (ref 65–139)
HCT VFR BLD AUTO: 41.7 % (ref 35–45)
HDLC SERPL-MCNC: 40 MG/DL
HGB BLD-MCNC: 13.8 G/DL (ref 11.7–15.5)
LDLC SERPL CALC-MCNC: 78 MG/DL (CALC)
LYMPHOCYTES # BLD AUTO: 1285 CELLS/UL (ref 850–3900)
LYMPHOCYTES NFR BLD AUTO: 18.9 %
MCH RBC QN AUTO: 28.9 PG (ref 27–33)
MCHC RBC AUTO-ENTMCNC: 33.1 G/DL (ref 32–36)
MCV RBC AUTO: 87.2 FL (ref 80–100)
MONOCYTES # BLD AUTO: 272 CELLS/UL (ref 200–950)
MONOCYTES NFR BLD AUTO: 4 %
NEUTROPHILS # BLD AUTO: 4964 CELLS/UL (ref 1500–7800)
NEUTROPHILS NFR BLD AUTO: 73 %
NONHDLC SERPL-MCNC: 92 MG/DL (CALC)
PLATELET # BLD AUTO: 239 THOUSAND/UL (ref 140–400)
PMV BLD REES-ECKER: 10.2 FL (ref 7.5–12.5)
POTASSIUM SERPL-SCNC: 3.7 MMOL/L (ref 3.5–5.3)
PROT SERPL-MCNC: 6.6 G/DL (ref 6.1–8.1)
RBC # BLD AUTO: 4.78 MILLION/UL (ref 3.8–5.1)
SODIUM SERPL-SCNC: 137 MMOL/L (ref 135–146)
T4 FREE SERPL-MCNC: 1.5 NG/DL (ref 0.8–1.8)
TRIGL SERPL-MCNC: 49 MG/DL
TSH SERPL-ACNC: 0.34 MIU/L
WBC # BLD AUTO: 6.8 THOUSAND/UL (ref 3.8–10.8)

## 2025-04-01 PROCEDURE — 87624 HPV HI-RISK TYP POOLED RSLT: CPT

## 2025-04-01 PROCEDURE — 88175 CYTOPATH C/V AUTO FLUID REDO: CPT

## 2025-04-03 ENCOUNTER — LAB REQUISITION (OUTPATIENT)
Dept: LAB | Facility: HOSPITAL | Age: 35
End: 2025-04-03
Payer: COMMERCIAL

## 2025-04-03 DIAGNOSIS — Z11.51 ENCOUNTER FOR SCREENING FOR HUMAN PAPILLOMAVIRUS (HPV): ICD-10-CM

## 2025-04-03 DIAGNOSIS — Z12.4 ENCOUNTER FOR SCREENING FOR MALIGNANT NEOPLASM OF CERVIX: ICD-10-CM

## 2025-04-03 DIAGNOSIS — Z01.419 ENCOUNTER FOR GYNECOLOGICAL EXAMINATION (GENERAL) (ROUTINE) WITHOUT ABNORMAL FINDINGS: ICD-10-CM

## 2025-04-17 LAB
CYTOLOGY CMNT CVX/VAG CYTO-IMP: NORMAL
HPV HR 12 DNA GENITAL QL NAA+PROBE: NEGATIVE
HPV HR GENOTYPES PNL CVX NAA+PROBE: NEGATIVE
HPV16 DNA SPEC QL NAA+PROBE: NEGATIVE
HPV18 DNA SPEC QL NAA+PROBE: NEGATIVE
LAB AP HPV GENOTYPE QUESTION: YES
LAB AP HPV HR: NORMAL
LABORATORY COMMENT REPORT: NORMAL
PATH REPORT.TOTAL CANCER: NORMAL

## 2025-05-26 DIAGNOSIS — E05.00 GRAVES' DISEASE: ICD-10-CM

## 2025-05-26 RX ORDER — LEVOTHYROXINE SODIUM 112 UG/1
112 TABLET ORAL
Qty: 90 TABLET | Refills: 1 | Status: SHIPPED | OUTPATIENT
Start: 2025-05-26 | End: 2025-11-22

## 2025-06-01 DIAGNOSIS — E10.65 TYPE 1 DIABETES MELLITUS WITH HYPERGLYCEMIA (MULTI): ICD-10-CM

## 2025-06-02 RX ORDER — INSULIN LISPRO 100 [IU]/ML
INJECTION, SOLUTION INTRAVENOUS; SUBCUTANEOUS
Qty: 10 ML | Refills: 6 | Status: SHIPPED | OUTPATIENT
Start: 2025-06-02

## 2025-08-06 ENCOUNTER — APPOINTMENT (OUTPATIENT)
Dept: ENDOCRINOLOGY | Facility: CLINIC | Age: 35
End: 2025-08-06
Payer: COMMERCIAL